# Patient Record
Sex: FEMALE | Race: WHITE | NOT HISPANIC OR LATINO | URBAN - METROPOLITAN AREA
[De-identification: names, ages, dates, MRNs, and addresses within clinical notes are randomized per-mention and may not be internally consistent; named-entity substitution may affect disease eponyms.]

---

## 2020-07-06 ENCOUNTER — EMERGENCY (EMERGENCY)
Facility: HOSPITAL | Age: 60
LOS: 1 days | Discharge: HOME | End: 2020-07-06
Attending: STUDENT IN AN ORGANIZED HEALTH CARE EDUCATION/TRAINING PROGRAM | Admitting: EMERGENCY MEDICINE
Payer: COMMERCIAL

## 2020-07-06 VITALS
OXYGEN SATURATION: 99 % | DIASTOLIC BLOOD PRESSURE: 62 MMHG | HEART RATE: 72 BPM | RESPIRATION RATE: 16 BRPM | SYSTOLIC BLOOD PRESSURE: 140 MMHG | TEMPERATURE: 98 F

## 2020-07-06 VITALS
SYSTOLIC BLOOD PRESSURE: 150 MMHG | DIASTOLIC BLOOD PRESSURE: 90 MMHG | TEMPERATURE: 98 F | OXYGEN SATURATION: 98 % | HEIGHT: 69 IN | WEIGHT: 179.9 LBS | HEART RATE: 70 BPM | RESPIRATION RATE: 14 BRPM

## 2020-07-06 LAB
ALBUMIN SERPL ELPH-MCNC: 3.4 G/DL — SIGNIFICANT CHANGE UP (ref 3.3–5)
ALP SERPL-CCNC: 48 U/L — SIGNIFICANT CHANGE UP (ref 40–120)
ALT FLD-CCNC: 23 U/L — SIGNIFICANT CHANGE UP (ref 12–78)
ANION GAP SERPL CALC-SCNC: 5 MMOL/L — SIGNIFICANT CHANGE UP (ref 5–17)
APPEARANCE UR: CLEAR — SIGNIFICANT CHANGE UP
AST SERPL-CCNC: 17 U/L — SIGNIFICANT CHANGE UP (ref 15–37)
BASOPHILS # BLD AUTO: 0.04 K/UL — SIGNIFICANT CHANGE UP (ref 0–0.2)
BASOPHILS NFR BLD AUTO: 0.5 % — SIGNIFICANT CHANGE UP (ref 0–2)
BILIRUB SERPL-MCNC: 0.6 MG/DL — SIGNIFICANT CHANGE UP (ref 0.2–1.2)
BILIRUB UR-MCNC: NEGATIVE — SIGNIFICANT CHANGE UP
BUN SERPL-MCNC: 15 MG/DL — SIGNIFICANT CHANGE UP (ref 7–23)
CALCIUM SERPL-MCNC: 8.9 MG/DL — SIGNIFICANT CHANGE UP (ref 8.5–10.1)
CHLORIDE SERPL-SCNC: 110 MMOL/L — HIGH (ref 96–108)
CO2 SERPL-SCNC: 26 MMOL/L — SIGNIFICANT CHANGE UP (ref 22–31)
COLOR SPEC: SIGNIFICANT CHANGE UP
CREAT SERPL-MCNC: 0.96 MG/DL — SIGNIFICANT CHANGE UP (ref 0.5–1.3)
DIFF PNL FLD: NEGATIVE — SIGNIFICANT CHANGE UP
EOSINOPHIL # BLD AUTO: 0.2 K/UL — SIGNIFICANT CHANGE UP (ref 0–0.5)
EOSINOPHIL NFR BLD AUTO: 2.3 % — SIGNIFICANT CHANGE UP (ref 0–6)
GLUCOSE SERPL-MCNC: 88 MG/DL — SIGNIFICANT CHANGE UP (ref 70–99)
GLUCOSE UR QL: NEGATIVE — SIGNIFICANT CHANGE UP
HCT VFR BLD CALC: 40.4 % — SIGNIFICANT CHANGE UP (ref 34.5–45)
HGB BLD-MCNC: 13.4 G/DL — SIGNIFICANT CHANGE UP (ref 11.5–15.5)
IMM GRANULOCYTES NFR BLD AUTO: 0.5 % — SIGNIFICANT CHANGE UP (ref 0–1.5)
KETONES UR-MCNC: ABNORMAL
LACTATE SERPL-SCNC: 0.9 MMOL/L — SIGNIFICANT CHANGE UP (ref 0.7–2)
LEUKOCYTE ESTERASE UR-ACNC: NEGATIVE — SIGNIFICANT CHANGE UP
LIDOCAIN IGE QN: 54 U/L — LOW (ref 73–393)
LYMPHOCYTES # BLD AUTO: 1.95 K/UL — SIGNIFICANT CHANGE UP (ref 1–3.3)
LYMPHOCYTES # BLD AUTO: 22.6 % — SIGNIFICANT CHANGE UP (ref 13–44)
MCHC RBC-ENTMCNC: 29.3 PG — SIGNIFICANT CHANGE UP (ref 27–34)
MCHC RBC-ENTMCNC: 33.2 GM/DL — SIGNIFICANT CHANGE UP (ref 32–36)
MCV RBC AUTO: 88.2 FL — SIGNIFICANT CHANGE UP (ref 80–100)
MONOCYTES # BLD AUTO: 0.62 K/UL — SIGNIFICANT CHANGE UP (ref 0–0.9)
MONOCYTES NFR BLD AUTO: 7.2 % — SIGNIFICANT CHANGE UP (ref 2–14)
NEUTROPHILS # BLD AUTO: 5.76 K/UL — SIGNIFICANT CHANGE UP (ref 1.8–7.4)
NEUTROPHILS NFR BLD AUTO: 66.9 % — SIGNIFICANT CHANGE UP (ref 43–77)
NITRITE UR-MCNC: NEGATIVE — SIGNIFICANT CHANGE UP
NRBC # BLD: 0 /100 WBCS — SIGNIFICANT CHANGE UP (ref 0–0)
OB PNL STL: POSITIVE
PH UR: 5 — SIGNIFICANT CHANGE UP (ref 5–8)
PLATELET # BLD AUTO: 210 K/UL — SIGNIFICANT CHANGE UP (ref 150–400)
POTASSIUM SERPL-MCNC: 4.1 MMOL/L — SIGNIFICANT CHANGE UP (ref 3.5–5.3)
POTASSIUM SERPL-SCNC: 4.1 MMOL/L — SIGNIFICANT CHANGE UP (ref 3.5–5.3)
PROT SERPL-MCNC: 7.2 G/DL — SIGNIFICANT CHANGE UP (ref 6–8.3)
PROT UR-MCNC: NEGATIVE — SIGNIFICANT CHANGE UP
RBC # BLD: 4.58 M/UL — SIGNIFICANT CHANGE UP (ref 3.8–5.2)
RBC # FLD: 12.9 % — SIGNIFICANT CHANGE UP (ref 10.3–14.5)
SODIUM SERPL-SCNC: 141 MMOL/L — SIGNIFICANT CHANGE UP (ref 135–145)
SP GR SPEC: 1 — LOW (ref 1.01–1.02)
UROBILINOGEN FLD QL: NEGATIVE — SIGNIFICANT CHANGE UP
WBC # BLD: 8.61 K/UL — SIGNIFICANT CHANGE UP (ref 3.8–10.5)
WBC # FLD AUTO: 8.61 K/UL — SIGNIFICANT CHANGE UP (ref 3.8–10.5)

## 2020-07-06 PROCEDURE — 86901 BLOOD TYPING SEROLOGIC RH(D): CPT

## 2020-07-06 PROCEDURE — 80053 COMPREHEN METABOLIC PANEL: CPT

## 2020-07-06 PROCEDURE — 81003 URINALYSIS AUTO W/O SCOPE: CPT

## 2020-07-06 PROCEDURE — 83605 ASSAY OF LACTIC ACID: CPT

## 2020-07-06 PROCEDURE — 74176 CT ABD & PELVIS W/O CONTRAST: CPT

## 2020-07-06 PROCEDURE — 99284 EMERGENCY DEPT VISIT MOD MDM: CPT | Mod: 25

## 2020-07-06 PROCEDURE — 36415 COLL VENOUS BLD VENIPUNCTURE: CPT

## 2020-07-06 PROCEDURE — 82272 OCCULT BLD FECES 1-3 TESTS: CPT

## 2020-07-06 PROCEDURE — 86900 BLOOD TYPING SEROLOGIC ABO: CPT

## 2020-07-06 PROCEDURE — 74176 CT ABD & PELVIS W/O CONTRAST: CPT | Mod: 26

## 2020-07-06 PROCEDURE — 96360 HYDRATION IV INFUSION INIT: CPT

## 2020-07-06 PROCEDURE — 86850 RBC ANTIBODY SCREEN: CPT

## 2020-07-06 PROCEDURE — 85027 COMPLETE CBC AUTOMATED: CPT

## 2020-07-06 PROCEDURE — 83690 ASSAY OF LIPASE: CPT

## 2020-07-06 PROCEDURE — 99284 EMERGENCY DEPT VISIT MOD MDM: CPT

## 2020-07-06 RX ORDER — IOHEXOL 300 MG/ML
30 INJECTION, SOLUTION INTRAVENOUS ONCE
Refills: 0 | Status: COMPLETED | OUTPATIENT
Start: 2020-07-06 | End: 2020-07-06

## 2020-07-06 RX ORDER — SODIUM CHLORIDE 9 MG/ML
1000 INJECTION INTRAMUSCULAR; INTRAVENOUS; SUBCUTANEOUS ONCE
Refills: 0 | Status: COMPLETED | OUTPATIENT
Start: 2020-07-06 | End: 2020-07-06

## 2020-07-06 RX ADMIN — SODIUM CHLORIDE 1000 MILLILITER(S): 9 INJECTION INTRAMUSCULAR; INTRAVENOUS; SUBCUTANEOUS at 14:58

## 2020-07-06 RX ADMIN — SODIUM CHLORIDE 1000 MILLILITER(S): 9 INJECTION INTRAMUSCULAR; INTRAVENOUS; SUBCUTANEOUS at 15:58

## 2020-07-06 RX ADMIN — IOHEXOL 30 MILLILITER(S): 300 INJECTION, SOLUTION INTRAVENOUS at 14:57

## 2020-07-06 NOTE — ED PROVIDER NOTE - NSFOLLOWUPINSTRUCTIONS_ED_ALL_ED_FT
Continue your antibiotics cipro and flagyl  stay well hydrated   Clear liquid diet advance slowly as tolerated to  Bananas Rice Tea and Toast (with margarine or jam)  then advance to Baked and boiled (eggs, pasta, chicken)  once tolerated you may advance slowly to regular  Eat small volumes   NO fatty fried foods, no milk or mild products, no tomato, spice, mint, tobacco, alcohol, caffeine  Stay well hydrated: a teaspoon at a time until able to tolerate normal intake.  Prompt follow up with your doctor is essential    return for worsening symptoms or any problems/concerns

## 2020-07-06 NOTE — ED PROVIDER NOTE - CLINICAL SUMMARY MEDICAL DECISION MAKING FREE TEXT BOX
60 F here after visit to another ED and to her GI doctor- has been having left lower quadrant pain and passing blood clots per rectum. CT at [facility] with colitis but patient has not been passing stool. Given surgical history will get CT with PO contrast to rule out obstruction. Ischemic colitis in differential but unlikely given presentation, will check lactate. Check labs for anemia, electrolyte abnormalities.

## 2020-07-06 NOTE — ED PROVIDER NOTE - PATIENT PORTAL LINK FT
You can access the FollowMyHealth Patient Portal offered by [facility] by registering at the following website: [email or web address]. By joining [employer]’s FollowMyHealth portal, you will also be able to view your health information using other applications (apps) compatible with our system.

## 2020-07-06 NOTE — ED PROVIDER NOTE - NS ED ROS FT
ROS:  GEN: (-) fevers/chills, (-) fatigue/malaise  HEENT: (-) visual change, (-) photophobia, (-) difficulty swallowing  NECK: (-) stiffness, (-) swelling  RESP: (-) shortness of breath, (-) cough, (-) sputum, (-) hemoptysis, (-) CARLIN  CV: (-) chest pain, (-) palpitations, (-) PND/orthopnea  GI: (-) nausea, (-) vomiting, (-) pain, (+) constipation, (-) diarrhea, (-) melena, (+) rectal bleeding  : (-) frequency/urgency, (-) hematuria, (-) dysuria, (-) incontinence, (-) discharge  EXT: (-) edema, (-) cyanosis  ENDO: (-) heat/cold intolerance, (-) polyuria  NEURO: (-) paresthesias, (-) weakness, (-) headache, (-) dizziness, (-) syncope  Psych: (-) depression, (-) anxiety, (-) SI, (-) HI, (-) AH, (-) VH  MSK: no recent trauma, no muscle pain

## 2020-07-06 NOTE — ED ADULT NURSE NOTE - OBJECTIVE STATEMENT
59 y/o female presents to the ed c/o of rectal bleeding since 4th of july. pt states she noticed blood in her stool and went to [facility] and was discharged, no ct done. c/o of abdominal pain, on cipro and flagyl. deniesn ausea vomiting fever chills chest pain sob headache and dysuria

## 2020-07-06 NOTE — ED PROVIDER NOTE - PHYSICAL EXAMINATION
PHYSICAL EXAMINATION:  VITALS REVIEWED.    GENERALIZED APPEARANCE:  Comfortable, no acute distress, ambulating without difficulty.  SKIN:  Warm and dry.  HEAD:  Normocephalic.  EYES:  Conjunctiva pink, no icterus.  ENMT:  Mucus membranes moist.  CHEST AND RESPIRATORY:  Clear to auscultation with good air entry bilaterally.  HEART AND CARDIOVASCULAR:  Regular rate, no obvious murmur.  ABDOMEN AND GI:  Decreased bowel sounds, abdomen Soft, non-distended.  No rebound, no guarding. Mild left upper / lower tenderness.  EXTREMITIES:  No deformity, edema, or calf tenderness.  NEURO: AAOx3, gross motor and sensory intact.  PSYCH: Normal affect.

## 2020-07-06 NOTE — ED PROVIDER NOTE - PROGRESS NOTE DETAILS
pt feels  much better while here has had bm with no blood she wants to go home and take her own abx  she has cipro and flagyl prescribd by [facility] er she follows with dr earnestine nichols and dr noelle sánchez  counnselled on dx and copies of results provided

## 2020-07-06 NOTE — ED PROVIDER NOTE - OBJECTIVE STATEMENT
history of bladder masses- with "Turbid?" done a week and a half ago. Rectal bleeding stated about 3 days ago. Was seen at [facility] then went to her GI doctor this morning and was referred here for CT with PO contrast. She has a history of bladder masses-  Had recent "Turbid?" (TURP) done a week and a half ago. Presenting for Rectal bleeding and abdominal pain which stated about 3 days ago. Pain is in her lower abdomen, more on the left, she describes it as similar to when she was giving birth. She has been passing clots of blood as well as gas. No clear inciting event although she notes she went out to eat and had a vodka soda which tasted strange. She was seen at [facility]- told she had a stomach infection and was discharged w Cipro / Flagyl. She then went to her GI doctor this morning and was referred here for CT with PO contrast. She no nausea, + constipation. No fevers, chills, vomiting. Has a surgical history of appendectomy.

## 2020-07-06 NOTE — ED PROVIDER NOTE - CARE PROVIDER_API CALL
Sukhdeep Badillo  GASTROENTEROLOGY  [address] [place] [address]  Phone: [phone]  Fax: [phone]  Follow Up Time:     Hai Dietz  INTERNAL MEDICINE  [address] [place] [address]  Phone: [phone]  Fax: [phone]  Follow Up Time:

## 2022-05-23 NOTE — ED ADULT NURSE NOTE - CHIEF COMPLAINT
The patient is a 60y Female complaining of rectal bleeding.
Patient/Caregiver provided printed discharge information.

## 2022-10-31 NOTE — ED PROVIDER NOTE - NS ED MD SHIFT CHANGE PLANNED DISPOSITION
Nsaids Counseling: NSAID Counseling: I discussed with the patient that NSAIDs should be taken with food. Prolonged use of NSAIDs can result in the development of stomach ulcers.  Patient advised to stop taking NSAIDs if abdominal pain occurs.  The patient verbalized understanding of the proper use and possible adverse effects of NSAIDs.  All of the patient's questions and concerns were addressed. pending test results documented on template

## 2023-10-03 ENCOUNTER — EMERGENCY (EMERGENCY)
Facility: HOSPITAL | Age: 63
LOS: 1 days | Discharge: HOME | End: 2023-10-03
Attending: EMERGENCY MEDICINE
Payer: COMMERCIAL

## 2023-10-03 VITALS
TEMPERATURE: 99 F | HEIGHT: 69 IN | DIASTOLIC BLOOD PRESSURE: 99 MMHG | HEART RATE: 84 BPM | OXYGEN SATURATION: 98 % | RESPIRATION RATE: 18 BRPM | WEIGHT: 190.04 LBS | SYSTOLIC BLOOD PRESSURE: 163 MMHG

## 2023-10-03 PROBLEM — K52.9 NONINFECTIVE GASTROENTERITIS AND COLITIS, UNSPECIFIED: Chronic | Status: ACTIVE | Noted: 2020-07-06

## 2023-10-03 PROCEDURE — 99284 EMERGENCY DEPT VISIT MOD MDM: CPT

## 2023-10-03 PROCEDURE — 99283 EMERGENCY DEPT VISIT LOW MDM: CPT

## 2023-10-03 RX ORDER — ACETAMINOPHEN 500 MG
1000 TABLET ORAL ONCE
Refills: 0 | Status: COMPLETED | OUTPATIENT
Start: 2023-10-03 | End: 2023-10-03

## 2023-10-03 RX ORDER — SODIUM CHLORIDE 9 MG/ML
1000 INJECTION INTRAMUSCULAR; INTRAVENOUS; SUBCUTANEOUS ONCE
Refills: 0 | Status: COMPLETED | OUTPATIENT
Start: 2023-10-03 | End: 2023-10-03

## 2023-10-03 RX ORDER — ACETAMINOPHEN 500 MG
650 TABLET ORAL ONCE
Refills: 0 | Status: COMPLETED | OUTPATIENT
Start: 2023-10-03 | End: 2023-10-03

## 2023-10-03 RX ADMIN — Medication 650 MILLIGRAM(S): at 06:47

## 2023-10-03 RX ADMIN — Medication 300 MILLIGRAM(S): at 06:47

## 2023-10-03 NOTE — ED ADULT TRIAGE NOTE - CHIEF COMPLAINT QUOTE
c/o erythema to face beginning Monday morning, left eye feels itchy; states feels like it feels a "chemical burn"; headache began this evening

## 2023-10-03 NOTE — ED PROVIDER NOTE - CLINICAL SUMMARY MEDICAL DECISION MAKING FREE TEXT BOX
64yo female pt PMHx bladder CA on remission who presents to the ED for facial pain, redness, warmth sicne one day ago and worsening. Of note, endorsing low grade fevers at home. On exam, found with blanching facial erythema and edema. Patient refusing blood work at this time. Patient amenable to PO abx and to return for worsening symptoms including tracking of erythema, pain and high grade fevers. Aidee: 64yo female pt PMHx bladder CA on remission who presents to the ED for facial pain, redness, warmth sicne one day ago and worsening. Of note, endorsing low grade fevers at home. On exam, found with blanching facial erythema and edema. Patient refusing blood work at this time. Patient amenable to PO abx and to return for worsening symptoms including tracking of erythema, pain and high grade fevers.

## 2023-10-03 NOTE — ED ADULT NURSE NOTE - NSFALLUNIVINTERV_ED_ALL_ED
Bed/Stretcher in lowest position, wheels locked, appropriate side rails in place/Call bell, personal items and telephone in reach/Instruct patient to call for assistance before getting out of bed/chair/stretcher/Non-slip footwear applied when patient is off stretcher/[place] to call system/Physically safe environment - no spills, clutter or unnecessary equipment/Purposeful proactive rounding/Room/bathroom lighting operational, light cord in reach

## 2023-10-03 NOTE — ED PROVIDER NOTE - PATIENT PORTAL LINK FT
You can access the FollowMyHealth Patient Portal offered by [facility] by registering at the following website: [email or web address]. By joining [email or web address] portal, you will also be able to view your health information using other applications (apps) compatible with our system.

## 2023-10-03 NOTE — ED ADULT NURSE NOTE - OBJECTIVE STATEMENT
63y F PMH bladder CA presents to the ED c/o headache and facial erythema. Pt reports "my eyes were swollen shut yesterday" a/w facial redness. Pt took half a benadryl yesterday AM with no relief of symptoms. Pt states "it feels like a chemical burn on my face", L >R. Denies cp, sob, respiratory involvement, nvd, abd pain, urinary symptoms. Reports 9/10 headache pain. Comfort and safety maintained.

## 2023-10-03 NOTE — ED PROVIDER NOTE - PHYSICAL EXAMINATION
eomi, peerla  + mild erythema to b/l face L>R and mild swelling.   no obvious gum swelling or abscess noted intraorally.

## 2023-10-03 NOTE — ED ADULT NURSE REASSESSMENT NOTE - NS ED NURSE REASSESS COMMENT FT1
Attempted to place IV in pt left Ac. Pt states that she has never felt this much pain before and that she has "Delivered a 9lb baby". Pt states that she was told by the physicians she would be able to get oral medications and be able to go home. Pt educated as to the need for iv placement, abx and fluid. Pt then declined iv and abx. Attending Aidee and Resident Emi Pope made aware and will follow up with pt.

## 2023-10-03 NOTE — ED PROVIDER NOTE - NSFOLLOWUPINSTRUCTIONS_ED_ALL_ED_FT
You were seen for cellulitis. Clindamycin was sent to your pharmacy. Important to complete antibiotic regimen to avoid resistance to antibiotics.     Please follow up with your primary care doctor for further management.     May take tylenol and/or motrin for fever and pain control.     Please return to the ED for worsening redness, pain, or any other concerns such as high grade fevers.

## 2023-10-03 NOTE — ED POST DISCHARGE NOTE - ADDITIONAL DOCUMENTATION
Received a call from patient who was very angry on the phone. She advised that her prescription was sent to the wrong pharmacy. Patient states that her and her doctor are "livid"  that blood work wasn't performed. Advised patient that can resend to whichever pharmacy she prefers. Resent prescription as intended. Chart further reviewed, patient was ordered for blood work. Per nursing documentation patient refused IV placement after failed attempt and was given PO abx instead. - Candi Meehan PA-C

## 2023-10-03 NOTE — ED PROVIDER NOTE - OBJECTIVE STATEMENT
63 year old female with left sided facial swelling and pain started today. was in sun over weekend and noted pain to left side face and erythema. c/o headache as well. states eyes were swollen shut yesterday and also with facial redness. took half a benadryl yesterday am with no relief of symptoms.

## 2023-10-03 NOTE — ED ADULT NURSE REASSESSMENT NOTE - NS ED NURSE REASSESS COMMENT FT1
Pt informed by Resident Emi Muñiz that Iv medications will be changed to PO medications. Attempted to administer medications however PT declined and is requesting a different nurse. Name and title provided to patient and RN Kaur Padron will administer medications. Attending Aidee and charge nurse Catherine Narayanan made aware.

## 2023-10-03 NOTE — ED PROVIDER NOTE - PROGRESS NOTE DETAILS
Aidee: Patient refusing IV after first attempt by nurse. spoke with patient and advised can start oral antibiotics. Patient agreeable. advised wont be able to check labs. agreeable. no systemic signs of infection. will start with oral abx. will dc home.

## 2024-02-18 NOTE — ED ADULT TRIAGE NOTE - BSA (M2)
[FreeTextEntry1] : FOLLOW UP IN 5 WEEKS TO CONTINUE TO MONITOR DIABETES AND TITRATE INSULIN/OTHER MEDICATIONS 1.97

## 2024-03-22 ENCOUNTER — HOSPITAL ENCOUNTER (OUTPATIENT)
Dept: CT IMAGING | Age: 64
Discharge: HOME | End: 2024-03-22
Payer: OTHER GOVERNMENT

## 2024-03-22 DIAGNOSIS — Z01.89 ENCOUNTER FOR OTHER SPECIFIED SPECIAL EXAMINATIONS: ICD-10-CM

## 2024-03-22 PROCEDURE — 71271 CT THORAX LUNG CANCER SCR C-: CPT

## 2025-04-05 ENCOUNTER — EMERGENCY (EMERGENCY)
Facility: HOSPITAL | Age: 65
LOS: 1 days | End: 2025-04-05
Attending: EMERGENCY MEDICINE | Admitting: EMERGENCY MEDICINE
Payer: COMMERCIAL

## 2025-04-05 VITALS
OXYGEN SATURATION: 99 % | TEMPERATURE: 98 F | HEART RATE: 70 BPM | SYSTOLIC BLOOD PRESSURE: 170 MMHG | DIASTOLIC BLOOD PRESSURE: 89 MMHG | RESPIRATION RATE: 18 BRPM

## 2025-04-05 VITALS
HEIGHT: 69 IN | SYSTOLIC BLOOD PRESSURE: 174 MMHG | OXYGEN SATURATION: 99 % | HEART RATE: 76 BPM | WEIGHT: 198.42 LBS | DIASTOLIC BLOOD PRESSURE: 88 MMHG | TEMPERATURE: 98 F | RESPIRATION RATE: 18 BRPM

## 2025-04-05 LAB
ALBUMIN SERPL ELPH-MCNC: 3.9 G/DL — SIGNIFICANT CHANGE UP (ref 3.3–5)
ALP SERPL-CCNC: 53 U/L — SIGNIFICANT CHANGE UP (ref 40–120)
ALT FLD-CCNC: 35 U/L — SIGNIFICANT CHANGE UP (ref 12–78)
ANION GAP SERPL CALC-SCNC: 9 MMOL/L — SIGNIFICANT CHANGE UP (ref 5–17)
APTT BLD: 29.6 SEC — SIGNIFICANT CHANGE UP (ref 24.5–35.6)
AST SERPL-CCNC: 23 U/L — SIGNIFICANT CHANGE UP (ref 15–37)
BASOPHILS # BLD AUTO: 0.05 K/UL — SIGNIFICANT CHANGE UP (ref 0–0.2)
BASOPHILS NFR BLD AUTO: 0.9 % — SIGNIFICANT CHANGE UP (ref 0–2)
BILIRUB SERPL-MCNC: 0.6 MG/DL — SIGNIFICANT CHANGE UP (ref 0.2–1.2)
BUN SERPL-MCNC: 18 MG/DL — SIGNIFICANT CHANGE UP (ref 7–23)
CALCIUM SERPL-MCNC: 9.5 MG/DL — SIGNIFICANT CHANGE UP (ref 8.5–10.1)
CHLORIDE SERPL-SCNC: 106 MMOL/L — SIGNIFICANT CHANGE UP (ref 96–108)
CO2 SERPL-SCNC: 24 MMOL/L — SIGNIFICANT CHANGE UP (ref 22–31)
CREAT SERPL-MCNC: 1.1 MG/DL — SIGNIFICANT CHANGE UP (ref 0.5–1.3)
EGFR: 56 ML/MIN/1.73M2 — LOW
EGFR: 56 ML/MIN/1.73M2 — LOW
EOSINOPHIL # BLD AUTO: 0.13 K/UL — SIGNIFICANT CHANGE UP (ref 0–0.5)
EOSINOPHIL NFR BLD AUTO: 2.3 % — SIGNIFICANT CHANGE UP (ref 0–6)
GLUCOSE SERPL-MCNC: 115 MG/DL — HIGH (ref 70–99)
HCT VFR BLD CALC: 44.2 % — SIGNIFICANT CHANGE UP (ref 34.5–45)
HGB BLD-MCNC: 15.1 G/DL — SIGNIFICANT CHANGE UP (ref 11.5–15.5)
IMM GRANULOCYTES NFR BLD AUTO: 0.4 % — SIGNIFICANT CHANGE UP (ref 0–0.9)
INR BLD: 0.91 RATIO — SIGNIFICANT CHANGE UP (ref 0.85–1.16)
LYMPHOCYTES # BLD AUTO: 1.94 K/UL — SIGNIFICANT CHANGE UP (ref 1–3.3)
LYMPHOCYTES # BLD AUTO: 34.1 % — SIGNIFICANT CHANGE UP (ref 13–44)
MCHC RBC-ENTMCNC: 29.5 PG — SIGNIFICANT CHANGE UP (ref 27–34)
MCHC RBC-ENTMCNC: 34.2 G/DL — SIGNIFICANT CHANGE UP (ref 32–36)
MCV RBC AUTO: 86.5 FL — SIGNIFICANT CHANGE UP (ref 80–100)
MONOCYTES # BLD AUTO: 0.45 K/UL — SIGNIFICANT CHANGE UP (ref 0–0.9)
MONOCYTES NFR BLD AUTO: 7.9 % — SIGNIFICANT CHANGE UP (ref 2–14)
NEUTROPHILS # BLD AUTO: 3.1 K/UL — SIGNIFICANT CHANGE UP (ref 1.8–7.4)
NEUTROPHILS NFR BLD AUTO: 54.4 % — SIGNIFICANT CHANGE UP (ref 43–77)
NRBC BLD AUTO-RTO: 0 /100 WBCS — SIGNIFICANT CHANGE UP (ref 0–0)
PLATELET # BLD AUTO: 214 K/UL — SIGNIFICANT CHANGE UP (ref 150–400)
POTASSIUM SERPL-MCNC: 4 MMOL/L — SIGNIFICANT CHANGE UP (ref 3.5–5.3)
POTASSIUM SERPL-SCNC: 4 MMOL/L — SIGNIFICANT CHANGE UP (ref 3.5–5.3)
PROT SERPL-MCNC: 7.7 G/DL — SIGNIFICANT CHANGE UP (ref 6–8.3)
PROTHROM AB SERPL-ACNC: 10.7 SEC — SIGNIFICANT CHANGE UP (ref 9.9–13.4)
RBC # BLD: 5.11 M/UL — SIGNIFICANT CHANGE UP (ref 3.8–5.2)
RBC # FLD: 12.3 % — SIGNIFICANT CHANGE UP (ref 10.3–14.5)
SODIUM SERPL-SCNC: 139 MMOL/L — SIGNIFICANT CHANGE UP (ref 135–145)
TROPONIN I, HIGH SENSITIVITY RESULT: <3 NG/L — SIGNIFICANT CHANGE UP
WBC # BLD: 5.69 K/UL — SIGNIFICANT CHANGE UP (ref 3.8–10.5)
WBC # FLD AUTO: 5.69 K/UL — SIGNIFICANT CHANGE UP (ref 3.8–10.5)

## 2025-04-05 PROCEDURE — 99285 EMERGENCY DEPT VISIT HI MDM: CPT

## 2025-04-05 PROCEDURE — 82962 GLUCOSE BLOOD TEST: CPT

## 2025-04-05 PROCEDURE — 36415 COLL VENOUS BLD VENIPUNCTURE: CPT

## 2025-04-05 PROCEDURE — 85610 PROTHROMBIN TIME: CPT

## 2025-04-05 PROCEDURE — 99285 EMERGENCY DEPT VISIT HI MDM: CPT | Mod: 25

## 2025-04-05 PROCEDURE — 85025 COMPLETE CBC W/AUTO DIFF WBC: CPT

## 2025-04-05 PROCEDURE — 93010 ELECTROCARDIOGRAM REPORT: CPT

## 2025-04-05 PROCEDURE — 93005 ELECTROCARDIOGRAM TRACING: CPT

## 2025-04-05 PROCEDURE — 80053 COMPREHEN METABOLIC PANEL: CPT

## 2025-04-05 PROCEDURE — 70450 CT HEAD/BRAIN W/O DYE: CPT | Mod: 26

## 2025-04-05 PROCEDURE — 70450 CT HEAD/BRAIN W/O DYE: CPT | Mod: MC

## 2025-04-05 PROCEDURE — 85730 THROMBOPLASTIN TIME PARTIAL: CPT

## 2025-04-05 PROCEDURE — 84484 ASSAY OF TROPONIN QUANT: CPT

## 2025-04-05 NOTE — ED PROVIDER NOTE - NEUROLOGICAL, MLM
Alert and oriented, no focal deficits, no motor or sensory deficits. CN II-XII intact. neurovascular intact. NIHSS: 0

## 2025-04-05 NOTE — ED ADULT NURSE NOTE - NSFALLUNIVINTERV_ED_ALL_ED
Bed/Stretcher in lowest position, wheels locked, appropriate side rails in place/Call bell, personal items and telephone in reach/Instruct patient to call for assistance before getting out of bed/chair/stretcher/Non-slip footwear applied when patient is off stretcher/[place] to call system/Physically safe environment - no spills, clutter or unnecessary equipment/Purposeful proactive rounding/Room/bathroom lighting operational, light cord in reach

## 2025-04-05 NOTE — ED PROVIDER NOTE - CLINICAL SUMMARY MEDICAL DECISION MAKING FREE TEXT BOX
64-year-old female with significant past medical history for bladder CA in remission presents to the ED with complaints of visual disturbance lasting approximately 15 minutes prior to arrival now resolved.  Patient states she was driving when she saw a rainbow colored lights in bilateral eyes and she was unable to see the K and her last name black in both eyes.  Patient denies any other associated symptoms.  Stroke code was called upon presentation.  Initial Noncon CT negative for acute stroke.  Tele stroke consulted for further evaluation and disposition.

## 2025-04-05 NOTE — ED PROVIDER NOTE - PATIENT PORTAL LINK FT
You can access the FollowMyHealth Patient Portal offered by [facility] by registering at the following website: [email or web address]. By joining [employer]’s FollowMyHealth portal, you will also be able to view your health information using other applications (apps) compatible with our system.

## 2025-04-05 NOTE — CHART NOTE - NSCHARTNOTEFT_GEN_A_CORE
[facility]                                        Neurology at [place]                                 Radha Hood, & Gio                                  [address]                                        [place], [place], [address]                                             [phone]          Case discussed with MIGUEL Desir.      The patient presented with transient visual disturbance lasting ~15 minutes.   Stroke code activated and Case was presented to the tele-stroke team.   Neurology called for further recommendations.     CT head images reviewed (and concur with report): There is no acute pathology.   There is chronic ischemic change noted.     Suggest Further work up to include carotid duplex and echocardiogram.  Suggest lipid panel and A1C in am.     Neurology consult when available.     MIGUEL Recio reports that patient may not be willing to stay.  Suggest that if she leaves hospital without above work up that it be Against Medical Advice.
Discussion with: Case was discussed with MIGUEL Desir on 4/5/2025 at 1:15 pm  via audio only consult     HISTORY OF PRESENT ILLNESS:  Patient is a 65 y/o f with no significant PMHx, who presented following an episode of vision changes. At around noon today, patient was at a bank when she suddenly did not feel well due to a strong vanilla smell. She stepped outside and felt better. While driving home, she suddenly saw rainbows and prisms in the b/l eyes associated with a headache and mild nausea. When she got home she tried to take out her contacts to see if it would make a difference, however it did not help. She noticed that she had trouble seeing the last letter of her son's last name but she cannot explain if the vision was black or blurry. The episode lasted for 15 minutes. In the ED, patient has a current NIHSS of 0. Her visual fields are intact and her visual acuity is 20/20 in both eyes.        PMH/PSH is PAST MEDICAL & SURGICAL HISTORY:  Colitis    Pre-stroke MRS=    *Modified [place] Score   0 - No symptoms.    CT HEAD IMAGING RESULTS:  CT Head:: No evidence of acute hemorrhage mass or mass effect.      Labs:  CAPILLARY BLOOD GLUCOSE      POCT Blood Glucose.: 109 mg/dL (05 Apr 2025 12:29)    Platelet Count - Automated: 214 K/uL (04-05-25 @ 12:50)    PT/INR - ( 05 Apr 2025 12:50 )   PT: 10.7 sec;   INR: 0.91 ratio         PTT - ( 05 Apr 2025 12:50 )  PTT:29.6 sec  NEUROLOGIC EXAMINATION deferred – interprofessional audio discussion only     Inclusion Criteria:  Clearly defined time onset within 4.5 hours of treatment Yes [x] No []    Ischemic stroke with a measurable deficit on NIHSS or a non-measurable deficit that is deemed disabling?  Yes [] no [x]  or  Unclear time of onset wake-up with stroke symptoms yes [] no[]  If yes:  [] Treatment can be initiated within 4.5 hrs. from symptom recognition  [] MRI can be obtained acutely from the emergency department  [] Not an endovascular stroke therapy candidate  [] Baseline functional independence.  Pre-stroke mRS of 0-1  [] NIHSS less than 26  [] DW-MRI with diffusion-positive. FLAIR – negative lesions indicating timing of stroke onset less than 4.5 hrs.  [] MRI mismatch between abnormal signal on DW-MRI and no visible signal change on FLAIR        Review thrombolytic CONTRAINDICATIONS  [] None    ABSOLUTE EXCLUSION CRITERIA:  [] Patient outside of the appropriate time window for IV thrombolysis  [] Evidence of intracranial hemorrhage on pretreatment CT scan (CT must be read by an attending radiologist or attending neurologist)  [] Head CT findings suggesting an established acute cerebral infarction as evidenced by rachelle hypodensity, regardless of size.  [] Clinical presentation consistent with subarachnoid hemorrhage, even with normal CT scan  [] Active internal bleeding  [] Known bleeding diathesis (including but not limited to platelet count < 100,000, use of oral anticoagulants with INR>1.7, use of full dose low molecular       weight heparin within the last 24 hours, use of unfractionated heparin AND a prolonged PTT (> 40sec), use of direct thrombin inhibitor (e.g. dabigatran) or oral direct Factor Xa inhibitor (e.g. rivaroxaban, apixaban) within 48 hours) with any degree of abnormality on any coagulation test; any DOAC taken within 3 hours of presentation, regardless of coagulation test results  [] Systolic pressure >= 185 mmHg or diastolic pressure 110 mmHg on repeated measurements at the time treatment is to begin  [] Care team unable to determine eligibility for IV thrombolysis  [] Patient, family, or surrogate declines and understands risks and benefits of treatment.  [] For wake-up Protocol patients: DW-MRI lesions larger than one-third of the MCA territory    RELATIVE EXCLUSION CRITERIA  [] Isolated neurological deficits (except for aphasia or hemianopsia)  [x] stroke severity too mild (non-disabling)  [] Seizure at onset with post-ictal residual neurological impairment  [] Gastrointestinal, genitourinary or respiratory hemorrhage within 21 days   [] Major surgery within 14 days   [] Blood glucose level < 50 or > 400 mG/dL  [] CPR with chest compressions or minor surgery (including liver and kidney biopsy, thoracocentesis, lumbar puncture) within 10 days   [] Arterial puncture at non-compressible site within 7 days   [] Evidence of acute trauma (fracture)   Significant head trauma or prior stroke in the previous 3 months  History of previous intracranial hemorrhage  Intracranial or intraspinal surgery within past 3 months[] Diabetic hemorrhagic retinopathy or ophthalmic bleeding  [] Pregnancy or peripartum; no nursing post- treatment  [] Post-myocardial infarction pericarditis  [] Peritoneal dialysis or hemodialysis or severe hepatic disease   [] Known bacterial endocarditis   [] Life expectancy less than 6 months or sever co-morbid illness   [] Known cerebral vascular malformation, untreated intracranial aneurysm or brain tumor (may consider IV alteplase in patients with CNS lesions that have a very low likelihood of hemorrhage, such as small un-ruptured aneurysms or benign tumors with low vascularity.  [] Social/Moravian reason  [] Other ____________________    ASSESSMENT: Patient is a 65 y/o f with no significant PMHx, who presented following an episode of vision changes associated with nausea and a headache, which lasted for 15 minutes. LKW at 12 pm. In the ED, patient has a current NIHSS of 0. Her visual fields are intact and her visual acuity is 20/20 in both eyes. CTH read no acute infarct or hemorrhage. CTA/CTP could not be done due to patient refusal (patient has an extensive anaphylactic history and refused to be premedicated for it).  Patient was not a TNK candidate at the time of consult as she did not have a focal disabling deficit on exam. She was not a neuro IR thrombectomy candidate as symptom are not consistent with an LVO.       RECOMMENDATIONS:  [] consider TIA workup, also consider seizure or possible complex migraine workup   [] gen neuro consult

## 2025-04-05 NOTE — ED PROVIDER NOTE - OBJECTIVE STATEMENT
64-year-old female with history of bladder cancer (in remission) presents to the ED, planing of visual changes while driving today.  Lasted 15 minutes, now completely resolved.  Patient explains she was driving started feeling brain bile-like/prisms like colors in her bilateral visual field.  Patient was wearing her contacts.  When she got home she took out her contacts thinking maybe it was related however she was still having the visual changes.  Patient also was unable to see the last letter in her son's name.  Unsure if it was cut off/black or blurry.  Patient also had a headache and mild nausea at that time.  Patient states that previous to this episode she was at the bank and there was a very strong vanilla smell that was making her not feel well.  Patient states she felt better when she stepped outside.  Denies dizziness, neck pain, speech or gait changes, upper or lower extremity weakness or paresthesias, chest pain, shortness of breath, abdominal pain, vomiting, syncope.

## 2025-04-05 NOTE — ED ADULT NURSE NOTE - OBJECTIVE STATEMENT
Pt presents to the ED c/o visual disturbances. Pt reports being at bank and smelling strong vanilla that caused headache. Pt states " I was in car when I saw a prism or rainbow but that has resolved now." Hx bladder CA, in remission. No neuro deficit observed, no facial asymmetry, equal  strength bilaterally, no arm/leg drift, positive strength to all four extremities, and positive sensation. IV established in left arm with a 20G, labs drawn and sent, call bell in reach, warm blanket provided, bed in lowest position, side rails up x2, MD evaluation in progress, pt on telemetry. NIHSS 0 at this time. Pt currently reports dull headache and nausea.

## 2025-04-05 NOTE — ED PROVIDER NOTE - PROGRESS NOTE DETAILS
Case discussed with telestroke provider [employer]!, recommends CTA head/neck with premedication. Patient declining at this time due to her extensive anaphylaxis history. Recommends discussed with patient including plan for admission however patient declining at this time. I explained that she would leave AMA however patient does not agree with this and wants to speak to patient advocate. This was escalated to assistant nursing manager Efrain who will speak with nursing supervisor. Patient states she has her own outpatient neurologist to follow up with. Case discussed with teleneuro, Dr. Verduzco, recommends admission for further stroke work up including but not limited to: echo, carotid duplex and MRI. Explains that carotid duplex would need to be performed prior to MRI to help decide of she needs an MRA as well. Discussed that patient is hesitant to be admitted for further stroke workup, Dr. Verduzco agrees that if patient wants to go home she will need to leave AMA. Patient remained to be completely asymptomatic in ED with NIHSS of 0

## 2025-04-05 NOTE — ED ADULT NURSE NOTE - CAS TRG GENERAL AIRWAY, MLM
Patent No lymphadedenopathy posterior cervical L/posterior cervical R/anterior cervical L/anterior cervical R/supraclavicular L/supraclavicular R/No lymphadedenopathy

## 2025-07-21 ENCOUNTER — HOSPITAL ENCOUNTER (OUTPATIENT)
Dept: CT IMAGING | Age: 65
Discharge: HOME | End: 2025-07-23
Payer: OTHER GOVERNMENT

## 2025-07-21 DIAGNOSIS — F17.210 CIGARETTE SMOKER: ICD-10-CM

## 2025-07-21 PROCEDURE — 71271 CT THORAX LUNG CANCER SCR C-: CPT

## 2025-07-21 NOTE — ED ADULT NURSE NOTE - NSFALLRSKINDICATORS_ED_ALL_ED
Patient assessed for the following post venofer:    Dizziness   No  Lightheadedness  No      Acute nausea/vomiting No  Headache   No  Chest pain/pressure  No  Rash/itching   No  Shortness of breath  No    Patient kept for 20 minutes observation post infusion of venofer    Patient tolerated treatment  without any complications.    Last vital signs:   /72   Pulse 79   Temp 98.5 °F (36.9 °C) (Oral)   Resp 18   Wt 49.5 kg (109 lb 3.2 oz)   LMP 08/14/2012   SpO2 98%   BMI 18.73 kg/m²       Patient instructed if experience any of the above symptoms following today's infusion,she is to notify MD immediately or go to the emergency department.    Discharge instructions given to patient. Verbalizes understanding. Ambulated off unit  with belongings.           \  
no